# Patient Record
Sex: FEMALE | Race: OTHER | HISPANIC OR LATINO
[De-identification: names, ages, dates, MRNs, and addresses within clinical notes are randomized per-mention and may not be internally consistent; named-entity substitution may affect disease eponyms.]

---

## 2021-03-02 PROBLEM — Z00.00 ENCOUNTER FOR PREVENTIVE HEALTH EXAMINATION: Status: ACTIVE | Noted: 2021-03-02

## 2021-03-03 ENCOUNTER — APPOINTMENT (OUTPATIENT)
Dept: CARDIOLOGY | Facility: CLINIC | Age: 46
End: 2021-03-03
Payer: COMMERCIAL

## 2021-03-03 ENCOUNTER — NON-APPOINTMENT (OUTPATIENT)
Age: 46
End: 2021-03-03

## 2021-03-03 VITALS
BODY MASS INDEX: 27.35 KG/M2 | OXYGEN SATURATION: 98 % | HEIGHT: 65 IN | DIASTOLIC BLOOD PRESSURE: 73 MMHG | RESPIRATION RATE: 12 BRPM | HEART RATE: 81 BPM | WEIGHT: 164.13 LBS | TEMPERATURE: 98.3 F | SYSTOLIC BLOOD PRESSURE: 114 MMHG

## 2021-03-03 DIAGNOSIS — Z86.39 PERSONAL HISTORY OF OTHER ENDOCRINE, NUTRITIONAL AND METABOLIC DISEASE: ICD-10-CM

## 2021-03-03 DIAGNOSIS — Z82.3 FAMILY HISTORY OF STROKE: ICD-10-CM

## 2021-03-03 DIAGNOSIS — Z86.69 PERSONAL HISTORY OF OTHER DISEASES OF THE NERVOUS SYSTEM AND SENSE ORGANS: ICD-10-CM

## 2021-03-03 DIAGNOSIS — U07.1 COVID-19: ICD-10-CM

## 2021-03-03 PROCEDURE — 99204 OFFICE O/P NEW MOD 45 MIN: CPT

## 2021-03-03 PROCEDURE — 93000 ELECTROCARDIOGRAM COMPLETE: CPT

## 2021-03-03 PROCEDURE — 99072 ADDL SUPL MATRL&STAF TM PHE: CPT

## 2021-03-04 ENCOUNTER — NON-APPOINTMENT (OUTPATIENT)
Age: 46
End: 2021-03-04

## 2021-03-04 PROBLEM — Z86.39 HISTORY OF THYROID NODULE: Status: RESOLVED | Noted: 2021-03-04 | Resolved: 2021-03-04

## 2021-03-04 PROBLEM — Z82.3 FAMILY HISTORY OF CEREBROVASCULAR ACCIDENT (CVA): Status: ACTIVE | Noted: 2021-03-04

## 2021-03-04 PROBLEM — Z86.69 HISTORY OF CARPAL TUNNEL SYNDROME: Status: RESOLVED | Noted: 2021-03-04 | Resolved: 2021-03-04

## 2021-03-04 PROBLEM — U07.1 COVID-19 VIRUS INFECTION: Status: RESOLVED | Noted: 2021-03-04 | Resolved: 2021-03-04

## 2021-03-04 RX ORDER — DILTIAZEM HYDROCHLORIDE 180 MG/1
180 CAPSULE, EXTENDED RELEASE ORAL DAILY
Refills: 0 | Status: ACTIVE | COMMUNITY

## 2021-03-04 RX ORDER — LISINOPRIL 10 MG/1
10 TABLET ORAL DAILY
Refills: 0 | Status: ACTIVE | COMMUNITY

## 2021-03-04 NOTE — HISTORY OF PRESENT ILLNESS
[FreeTextEntry1] : 46 yo female with hypertension and reported history of "arrhythmia" per PMD's referral for which she was seen by Dr. Gómez Krishna (EP) ~ 2 years ago. Patient was referred for further cardiac evaluation and management. Patient denies any known history of arrhythmia but does report having palpitations in the past. She currently reports intermittent exertion dyspnea particularly with stairs, but denies chest pain. Patient denies syncope, edema, melena, hematochezia, or hematemesis.\par \par PMD: [place] [facility]

## 2021-03-04 NOTE — REVIEW OF SYSTEMS
[Dyspnea on exertion] : dyspnea during exertion [Palpitations] : palpitations [Negative] : Heme/Lymph [Chest Pain] : no chest pain [Lower Ext Edema] : no extremity edema [Leg Claudication] : no intermittent leg claudication

## 2021-03-04 NOTE — ASSESSMENT
[FreeTextEntry1] : 44 yo female with hypertension, reported history of "arrhythmia" evaluated ~ 2 years ago, and intermittent exertion dyspnea particularly with stairs, but denies chest pain. \par ECG today demonstrated sinus rhythm with delayed R wave progression.\par \par Will perform echocardiogram to assess LV function and structural heart disease.\par WIll perform treadmill stress ECG for ischemic evaluation/risk stratification.\par Pending test results, will determine if further cardiac work-up or intervention is clinically indicated.\par \par BP is currently well controlled.\par Will continue diltiazem  mg po daily and lisinopril 10 mg po daily.\par \par Will try to obtain prior records from Dr. Gómez Krishna's office to review evaluation/management of her reported "arrhythmia" 2 years ago.

## 2021-03-04 NOTE — PHYSICAL EXAM
[General Appearance - Well Developed] : well developed [General Appearance - Well Nourished] : well nourished [General Appearance - In No Acute Distress] : no acute distress [Normal Conjunctiva] : the conjunctiva exhibited no abnormalities [No Oral Cyanosis] : no oral cyanosis [Normal Rate] : normal [Rhythm Regular] : regular [Normal S1] : normal S1 [Normal S2] : normal S2 [No Murmur] : no murmurs heard [2+] : left 2+ [No Pitting Edema] : no pitting edema present [] : no respiratory distress [Respiration, Rhythm And Depth] : normal respiratory rhythm and effort [Auscultation Breath Sounds / Voice Sounds] : lungs were clear to auscultation bilaterally [Bowel Sounds] : normal bowel sounds [Abnormal Walk] : normal gait [Nail Clubbing] : no clubbing of the fingernails [Cyanosis, Localized] : no localized cyanosis [Oriented To Time, Place, And Person] : oriented to person, place, and time [Affect] : the affect was normal [Mood] : the mood was normal [FreeTextEntry1] : No JVD present [S3] : no S3 [S4] : no S4 [Right Carotid Bruit] : no bruit heard over the right carotid [Left Carotid Bruit] : no bruit heard over the left carotid

## 2021-03-16 ENCOUNTER — APPOINTMENT (OUTPATIENT)
Dept: CARDIOLOGY | Facility: CLINIC | Age: 46
End: 2021-03-16
Payer: COMMERCIAL

## 2021-03-16 PROCEDURE — 99072 ADDL SUPL MATRL&STAF TM PHE: CPT

## 2021-03-16 PROCEDURE — 93015 CV STRESS TEST SUPVJ I&R: CPT

## 2021-04-12 ENCOUNTER — RESULT REVIEW (OUTPATIENT)
Age: 46
End: 2021-04-12

## 2021-06-10 ENCOUNTER — APPOINTMENT (OUTPATIENT)
Dept: CARDIOLOGY | Facility: CLINIC | Age: 46
End: 2021-06-10
Payer: COMMERCIAL

## 2021-06-10 VITALS
RESPIRATION RATE: 12 BRPM | SYSTOLIC BLOOD PRESSURE: 128 MMHG | WEIGHT: 164 LBS | HEIGHT: 65 IN | DIASTOLIC BLOOD PRESSURE: 78 MMHG | BODY MASS INDEX: 27.32 KG/M2 | OXYGEN SATURATION: 98 % | HEART RATE: 83 BPM

## 2021-06-10 VITALS
RESPIRATION RATE: 13 BRPM | TEMPERATURE: 98.6 F | HEIGHT: 65 IN | SYSTOLIC BLOOD PRESSURE: 148 MMHG | HEART RATE: 83 BPM | WEIGHT: 164 LBS | DIASTOLIC BLOOD PRESSURE: 88 MMHG | BODY MASS INDEX: 27.32 KG/M2 | OXYGEN SATURATION: 98 %

## 2021-06-10 DIAGNOSIS — Z87.898 PERSONAL HISTORY OF OTHER SPECIFIED CONDITIONS: ICD-10-CM

## 2021-06-10 DIAGNOSIS — R06.02 SHORTNESS OF BREATH: ICD-10-CM

## 2021-06-10 DIAGNOSIS — G47.00 INSOMNIA, UNSPECIFIED: ICD-10-CM

## 2021-06-10 DIAGNOSIS — I10 ESSENTIAL (PRIMARY) HYPERTENSION: ICD-10-CM

## 2021-06-10 DIAGNOSIS — G44.209 TENSION-TYPE HEADACHE, UNSPECIFIED, NOT INTRACTABLE: ICD-10-CM

## 2021-06-10 PROCEDURE — 99072 ADDL SUPL MATRL&STAF TM PHE: CPT

## 2021-06-10 PROCEDURE — 99214 OFFICE O/P EST MOD 30 MIN: CPT

## 2021-06-10 NOTE — HISTORY OF PRESENT ILLNESS
[FreeTextEntry1] : 44 yo female with hypertension, who presents today for follow-up. She denies chest pain, palpitations, or dyspnea. She does report intermittent headaches since this past Saturday, which begin in the back of her head and upper neck. She has been taking ibuprofen for the headaches with intermittent relief. She reports increased stress at work. She also reports difficulty sleeping, which she has been taking Advil PM as needed. However, she reports getting ~ 6 hours of sleep per night.\par \par PMD: [place] [facility]

## 2021-06-10 NOTE — CARDIOLOGY SUMMARY
[de-identified] : \par 3/3/21 ECG: Sinus rhythm, rate 81 bpm, delayed R wave progression [de-identified] : \par 3/16/21 Treadmill stress ECG: No chest pain or ECG changes at 90% max pred HR, 10.1 METs. No arrhythmias during stress or recovery. Good HR recovery. [de-identified] : \par 4/13/21 Echo: Normal LV size and systolic function, LVEF 70%. Grade II diastolic dysfunction. Mobile IAS without shunt. Trace MR. Mild [place]. Normal PASP.

## 2021-06-10 NOTE — ASSESSMENT
[FreeTextEntry1] : 46 yo female with hypertension, tension headaches, and insomnia.\par \par BP is currently well controlled.\par Will continue diltiazem  mg po daily and lisinopril 10 mg po daily.\par \par Patient was advised to continue ibuprofen or acetaminophen for her tension headaches. Patient was advised to be careful of taking too much ibuprofen due to potential for gastritis/gastric ulcers.\par Stress management is also advised.\par \par Patient was advised to take diphenhydramine alone rather than Advil PM to avoid taking too much ibuprofen particularly it is not needed for acute pain control.\par She may take 25 mg of diphenhydramine 30 minutes before bedtime as needed.\par